# Patient Record
Sex: FEMALE | Race: WHITE
[De-identification: names, ages, dates, MRNs, and addresses within clinical notes are randomized per-mention and may not be internally consistent; named-entity substitution may affect disease eponyms.]

---

## 2019-04-15 ENCOUNTER — HOSPITAL ENCOUNTER (OUTPATIENT)
Dept: HOSPITAL 92 - BICMAMMO | Age: 64
Discharge: HOME | End: 2019-04-15
Attending: NURSE PRACTITIONER
Payer: COMMERCIAL

## 2019-04-15 DIAGNOSIS — Z12.31: Primary | ICD-10-CM

## 2019-04-15 PROCEDURE — 77063 BREAST TOMOSYNTHESIS BI: CPT

## 2019-04-15 PROCEDURE — 77067 SCR MAMMO BI INCL CAD: CPT

## 2019-04-24 NOTE — MMO
Bilateral MAMMO Bilat Screen [facility].

 

CLINICAL HISTORY:

Patient is 63 years old and is seen for screening. The patient has no family

history of breast cancer.  The patient has no personal history of cancer.

 

VIEWS:

The views performed were:  bilateral craniocaudal with tomosynthesis and

bilateral mediolateral oblique with tomosynthesis.

 

FILMS COMPARED:

The present examination has been compared to prior imaging studies performed at

[facility] on 02/06/2009, 02/18/2011, 02/01/2013 and

02/17/2015.

 

MAMMOGRAM FINDINGS:

There are scattered fibroglandular densities.

 

There are no suspicious masses, suspicious calcifications, or new areas of

architectural distortion.

 

IMPRESSION:

THERE IS NO MAMMOGRAPHIC EVIDENCE OF MALIGNANCY.

 

A ROUTINE FOLLOW-UP MAMMOGRAM IN 1 YEAR IS RECOMMENDED.

 

THE RESULTS OF THIS EXAM WERE SENT TO THE PATIENT.

 

ACR BI-RADS Category 1 - Negative

 

MAMMOGRAPHY NOTE:

 1. A negative mammogram report should not delay a biopsy if a dominant of

 clinically suspicious mass is present.

 2. Approximately 10% to 15% of breast cancers are not detected by

 mammography.

 3. Adenosis and dense breasts may obscure an underlying neoplasm.

## 2021-06-09 ENCOUNTER — HOSPITAL ENCOUNTER (OUTPATIENT)
Dept: HOSPITAL 92 - BICMAMMO | Age: 66
Discharge: HOME | End: 2021-06-09
Attending: NURSE PRACTITIONER
Payer: MEDICARE

## 2021-06-09 DIAGNOSIS — Z13.820: Primary | ICD-10-CM

## 2021-06-09 PROCEDURE — 77080 DXA BONE DENSITY AXIAL: CPT

## 2023-02-06 ENCOUNTER — HOSPITAL ENCOUNTER (EMERGENCY)
Dept: HOSPITAL 57 - BURERS | Age: 68
Discharge: HOME | End: 2023-02-06
Payer: MEDICARE

## 2023-02-06 DIAGNOSIS — B34.9: Primary | ICD-10-CM

## 2023-02-06 DIAGNOSIS — I10: ICD-10-CM

## 2023-02-06 DIAGNOSIS — E03.9: ICD-10-CM

## 2023-02-06 DIAGNOSIS — Z79.899: ICD-10-CM

## 2023-02-06 LAB
ALBUMIN SERPL BCG-MCNC: 4.7 G/DL (ref 3.4–4.8)
ALP SERPL-CCNC: 56 U/L (ref 40–110)
ALT SERPL W P-5'-P-CCNC: 47 U/L (ref 8–55)
ANION GAP SERPL CALC-SCNC: 18 MMOL/L (ref 10–20)
AST SERPL-CCNC: 38 U/L (ref 5–34)
BASOPHILS # BLD AUTO: 0.1 THOU/UL (ref 0–0.2)
BASOPHILS NFR BLD AUTO: 0.9 % (ref 0–1)
BILIRUB SERPL-MCNC: 0.4 MG/DL (ref 0.2–1.2)
BUN SERPL-MCNC: 12 MG/DL (ref 9.8–20.1)
CALCIUM SERPL-MCNC: 9.9 MG/DL (ref 7.8–10.44)
CHLORIDE SERPL-SCNC: 102 MMOL/L (ref 98–107)
CO2 SERPL-SCNC: 21 MMOL/L (ref 23–31)
CREAT CL PREDICTED SERPL C-G-VRATE: 0 ML/MIN (ref 70–130)
EOSINOPHIL # BLD AUTO: 0.1 THOU/UL (ref 0–0.7)
EOSINOPHIL NFR BLD AUTO: 1.1 % (ref 0–10)
GLOBULIN SER CALC-MCNC: 3.7 G/DL (ref 2.4–3.5)
GLUCOSE SERPL-MCNC: 107 MG/DL (ref 80–115)
HGB BLD-MCNC: 14.6 G/DL (ref 12–16)
LYMPHOCYTES # BLD AUTO: 0.5 THOU/UL (ref 1.2–3.4)
LYMPHOCYTES NFR BLD AUTO: 5.3 % (ref 21–51)
MCH RBC QN AUTO: 28.8 PG (ref 27–31)
MCV RBC AUTO: 87.1 FL (ref 78–98)
MONOCYTES # BLD AUTO: 0.6 THOU/UL (ref 0.11–0.59)
MONOCYTES NFR BLD AUTO: 6.4 % (ref 0–10)
NEUTROPHILS # BLD AUTO: 7.8 THOU/UL (ref 1.4–6.5)
NEUTROPHILS NFR BLD AUTO: 86.4 % (ref 42–75)
PLATELET # BLD AUTO: 300 10X3/UL (ref 130–400)
POTASSIUM SERPL-SCNC: 3.5 MMOL/L (ref 3.5–5.1)
RBC # BLD AUTO: 5.06 MILL/UL (ref 4.2–5.4)
SODIUM SERPL-SCNC: 137 MMOL/L (ref 136–145)
WBC # BLD AUTO: 9.1 10X3/UL (ref 4.8–10.8)

## 2023-02-06 PROCEDURE — 87804 INFLUENZA ASSAY W/OPTIC: CPT

## 2023-02-06 PROCEDURE — 80053 COMPREHEN METABOLIC PANEL: CPT

## 2023-02-06 PROCEDURE — 83605 ASSAY OF LACTIC ACID: CPT

## 2023-02-06 PROCEDURE — 87040 BLOOD CULTURE FOR BACTERIA: CPT

## 2023-02-06 PROCEDURE — 93005 ELECTROCARDIOGRAM TRACING: CPT

## 2023-02-06 PROCEDURE — 90715 TDAP VACCINE 7 YRS/> IM: CPT

## 2023-02-06 PROCEDURE — 36415 COLL VENOUS BLD VENIPUNCTURE: CPT

## 2023-02-06 PROCEDURE — 90471 IMMUNIZATION ADMIN: CPT

## 2023-02-06 PROCEDURE — 85025 COMPLETE CBC W/AUTO DIFF WBC: CPT

## 2023-02-06 PROCEDURE — 87807 RSV ASSAY W/OPTIC: CPT

## 2023-02-06 PROCEDURE — 84484 ASSAY OF TROPONIN QUANT: CPT

## 2023-02-06 PROCEDURE — 71045 X-RAY EXAM CHEST 1 VIEW: CPT
